# Patient Record
Sex: FEMALE | Race: WHITE | NOT HISPANIC OR LATINO | Employment: FULL TIME | URBAN - METROPOLITAN AREA
[De-identification: names, ages, dates, MRNs, and addresses within clinical notes are randomized per-mention and may not be internally consistent; named-entity substitution may affect disease eponyms.]

---

## 2022-08-24 ENCOUNTER — OFFICE VISIT (OUTPATIENT)
Dept: URGENT CARE | Facility: URGENT CARE | Age: 27
End: 2022-08-24
Payer: COMMERCIAL

## 2022-08-24 VITALS
DIASTOLIC BLOOD PRESSURE: 92 MMHG | TEMPERATURE: 98.6 F | OXYGEN SATURATION: 100 % | SYSTOLIC BLOOD PRESSURE: 129 MMHG | HEART RATE: 69 BPM

## 2022-08-24 DIAGNOSIS — R21 RASH: ICD-10-CM

## 2022-08-24 DIAGNOSIS — R07.0 THROAT PAIN: Primary | ICD-10-CM

## 2022-08-24 LAB — DEPRECATED S PYO AG THROAT QL EIA: NEGATIVE

## 2022-08-24 PROCEDURE — 87651 STREP A DNA AMP PROBE: CPT | Performed by: PHYSICIAN ASSISTANT

## 2022-08-24 PROCEDURE — 99203 OFFICE O/P NEW LOW 30 MIN: CPT | Performed by: PHYSICIAN ASSISTANT

## 2022-08-24 RX ORDER — SPIRONOLACTONE 100 MG/1
100 TABLET, FILM COATED ORAL AT BEDTIME
COMMUNITY
Start: 2022-05-31

## 2022-08-24 RX ORDER — NORGESTIMATE AND ETHINYL ESTRADIOL 7DAYSX3 LO
1 KIT ORAL DAILY
COMMUNITY
Start: 2021-11-16

## 2022-08-24 ASSESSMENT — PAIN SCALES - GENERAL: PAINLEVEL: MILD PAIN (2)

## 2022-08-24 NOTE — PROGRESS NOTES
Assessment & Plan     1. Throat pain  No evidence of PTA or RPA  RST negative, PCR pending  Defer mono testing at this time  Fluids, rest, salt water gargles, tylenol / ibuprofen for pain  - Streptococcus A Rapid Screen w/Reflex to PCR - Clinic Collect      2. Rash  Very faint and improving        Return in about 5 days (around 8/29/2022), or if symptoms worsen or fail to improve.    Diagnosis and treatment plan was reviewed with patient and/or family.   We went over any labs or imaging. Discussed worsening symptoms or little to no relief despite treatment plan to follow-up with PCP or return to clinic.  Patient verbalizes understanding. All questions were addressed and answered.     Perla Lester PA-C  [facility]    CHIEF COMPLAINT:   Chief Complaint   Patient presents with     Pharyngitis     Rash     Yadira Stanton is a 26 year old female who presents to clinic today for evaluation of sore throat. She has history of aphthous ulcers and had one last week. That slowly improved and then she woke up again this AM with a sore throat. She looked in the back of her throat and noticed red, swollen exudative tonsils. She denies having fever or chills. No congestion, cough or runny nose.   Noticed a rash several days since the throat pain started      No past medical history on file.  No past surgical history on file.  Social History     Tobacco Use     Smoking status: Not on file     Smokeless tobacco: Not on file   Substance Use Topics     Alcohol use: Not on file     Current Outpatient Medications   Medication     norgestim-eth estrad triphasic (ORTHO TRI-CYCLEN LO) 0.18/0.215/0.25 MG-25 MCG tablet     spironolactone (ALDACTONE) 100 MG tablet     No current facility-administered medications for this visit.     No Known Allergies    10 point ROS of systems were all negative except for pertinent positives noted in my HPI.      Exam:   BP (!) 129/92   Pulse 69   Temp 98.6  F (37   C)   SpO2 100%   Constitutional: healthy, alert and no distress  Head: Normocephalic, atraumatic.  Eyes: conjunctiva clear, no drainage  ENT: TMs clear and shiny thania, nasal mucosa pink and moist, throat is erythematous without trismus or drooling.   Neck: neck is supple, no cervical lymphadenopathy or nuchal rigidity  Cardiovascular: RRR  Respiratory: CTA bilaterally, no rhonchi or rales  Skin: faint erythema on forearms  Neurologic: Speech clear, gait normal. Moves all extremities.    Results for orders placed or performed in visit on 08/24/22   Streptococcus A Rapid Screen w/Reflex to PCR - Clinic Collect     Status: Normal    Specimen: Throat; Swab   Result Value Ref Range    Group A Strep antigen Negative Negative

## 2022-08-25 LAB — GROUP A STREP BY PCR: NOT DETECTED
